# Patient Record
Sex: OTHER/UNKNOWN | URBAN - METROPOLITAN AREA
[De-identification: names, ages, dates, MRNs, and addresses within clinical notes are randomized per-mention and may not be internally consistent; named-entity substitution may affect disease eponyms.]

---

## 2024-09-19 ENCOUNTER — APPOINTMENT (OUTPATIENT)
Dept: URBAN - METROPOLITAN AREA CLINIC 300 | Age: 24
Setting detail: DERMATOLOGY
End: 2024-09-20

## 2024-09-19 VITALS — WEIGHT: 145 LBS | HEIGHT: 56 IN

## 2024-09-19 DIAGNOSIS — L72.11 PILAR CYST: ICD-10-CM

## 2024-09-19 PROCEDURE — OTHER ADDITIONAL NOTES: OTHER

## 2024-09-19 PROCEDURE — OTHER REASSURANCE: OTHER

## 2024-09-19 PROCEDURE — OTHER COUNSELING: OTHER

## 2024-09-19 PROCEDURE — 99202 OFFICE O/P NEW SF 15 MIN: CPT

## 2024-09-19 PROCEDURE — OTHER DEFER: OTHER

## 2024-09-19 NOTE — PROCEDURE: ADDITIONAL NOTES
Additional Notes: 3 Pilar cysts on left mid and posterior scalp
Render Risk Assessment In Note?: no
Detail Level: Simple

## 2024-09-25 ENCOUNTER — APPOINTMENT (OUTPATIENT)
Dept: URBAN - METROPOLITAN AREA CLINIC 301 | Age: 24
Setting detail: DERMATOLOGY
End: 2024-09-27

## 2024-09-25 DIAGNOSIS — L72.8 OTHER FOLLICULAR CYSTS OF THE SKIN AND SUBCUTANEOUS TISSUE: ICD-10-CM

## 2024-09-25 PROCEDURE — 11423 EXC H-F-NK-SP B9+MARG 2.1-3: CPT | Mod: 76

## 2024-09-25 PROCEDURE — OTHER EXCISION: OTHER

## 2024-09-25 PROCEDURE — 11423 EXC H-F-NK-SP B9+MARG 2.1-3: CPT

## 2024-09-25 PROCEDURE — OTHER COUNSELING: OTHER

## 2024-09-25 PROCEDURE — OTHER MIPS QUALITY: OTHER

## 2024-09-25 PROCEDURE — 11422 EXC H-F-NK-SP B9+MARG 1.1-2: CPT

## 2024-09-25 ASSESSMENT — LOCATION DETAILED DESCRIPTION DERM
LOCATION DETAILED: LEFT MEDIAL FRONTAL SCALP
LOCATION DETAILED: LEFT CENTRAL PARIETAL SCALP
LOCATION DETAILED: POSTERIOR MID-PARIETAL SCALP
LOCATION DETAILED: LEFT SUPERIOR PARIETAL SCALP
LOCATION DETAILED: LEFT MEDIAL FRONTAL SCALP
LOCATION DETAILED: LEFT CENTRAL PARIETAL SCALP

## 2024-09-25 ASSESSMENT — LOCATION SIMPLE DESCRIPTION DERM
LOCATION SIMPLE: LEFT SCALP
LOCATION SIMPLE: SCALP
LOCATION SIMPLE: POSTERIOR SCALP
LOCATION SIMPLE: SCALP
LOCATION SIMPLE: LEFT SCALP

## 2024-09-25 ASSESSMENT — LOCATION ZONE DERM
LOCATION ZONE: SCALP
LOCATION ZONE: SCALP

## 2024-09-25 NOTE — PROCEDURE: EXCISION

## 2024-10-10 ENCOUNTER — APPOINTMENT (OUTPATIENT)
Dept: URBAN - METROPOLITAN AREA CLINIC 301 | Age: 24
Setting detail: DERMATOLOGY
End: 2024-10-10

## 2024-10-10 DIAGNOSIS — Z48.02 ENCOUNTER FOR REMOVAL OF SUTURES: ICD-10-CM

## 2024-10-10 PROCEDURE — OTHER SUTURE REMOVAL (GLOBAL PERIOD): OTHER

## 2024-10-10 PROCEDURE — 99024 POSTOP FOLLOW-UP VISIT: CPT

## 2024-10-10 PROCEDURE — OTHER COUNSELING: OTHER

## 2024-10-10 ASSESSMENT — LOCATION SIMPLE DESCRIPTION DERM
LOCATION SIMPLE: RIGHT FOREHEAD
LOCATION SIMPLE: SCALP

## 2024-10-10 ASSESSMENT — LOCATION ZONE DERM
LOCATION ZONE: SCALP
LOCATION ZONE: FACE

## 2024-10-10 ASSESSMENT — LOCATION DETAILED DESCRIPTION DERM
LOCATION DETAILED: LEFT SUPERIOR PARIETAL SCALP
LOCATION DETAILED: RIGHT SUPERIOR PARIETAL SCALP
LOCATION DETAILED: RIGHT SUPERIOR MEDIAL FOREHEAD

## 2024-10-10 NOTE — PROCEDURE: SUTURE REMOVAL (GLOBAL PERIOD)
Detail Level: Detailed
Add 51926 Cpt? (Important Note: In 2017 The Use Of 13840 Is Being Tracked By Cms To Determine Future Global Period Reimbursement For Global Periods): yes

## 2025-06-28 NOTE — PROCEDURE: REASSURANCE
Successful removal of eye foreign body.  Please follow-up with your eye doctor, call their office tomorrow.  Utilize ofloxacin drops 4 times a day for additional relief.  Use erythromycin ointment at night for additional lubrication.  May use artificial tears as needed.  
Detail Level: Detailed
Hide Additional Notes?: No